# Patient Record
Sex: FEMALE | Race: AMERICAN INDIAN OR ALASKA NATIVE
[De-identification: names, ages, dates, MRNs, and addresses within clinical notes are randomized per-mention and may not be internally consistent; named-entity substitution may affect disease eponyms.]

---

## 2018-07-02 ENCOUNTER — HOSPITAL ENCOUNTER (OUTPATIENT)
Dept: HOSPITAL 5 - GIO | Age: 48
Discharge: HOME | End: 2018-07-02
Attending: INTERNAL MEDICINE
Payer: COMMERCIAL

## 2018-07-02 DIAGNOSIS — E66.9: ICD-10-CM

## 2018-07-02 DIAGNOSIS — K63.89: ICD-10-CM

## 2018-07-02 DIAGNOSIS — K57.30: ICD-10-CM

## 2018-07-02 DIAGNOSIS — Z82.49: ICD-10-CM

## 2018-07-02 DIAGNOSIS — K59.00: Primary | ICD-10-CM

## 2018-07-02 DIAGNOSIS — Z79.899: ICD-10-CM

## 2018-07-02 DIAGNOSIS — Z90.89: ICD-10-CM

## 2018-07-02 DIAGNOSIS — K64.8: ICD-10-CM

## 2018-07-02 DIAGNOSIS — Z88.8: ICD-10-CM

## 2018-07-02 PROCEDURE — 45388 COLONOSCOPY W/ABLATION: CPT

## 2018-07-02 PROCEDURE — 45384 COLONOSCOPY W/LESION REMOVAL: CPT

## 2018-07-02 PROCEDURE — 88305 TISSUE EXAM BY PATHOLOGIST: CPT

## 2018-07-02 NOTE — OPERATIVE REPORT
Operative Report


Operative Report: 


Date of procedure: 07/02/2018





Procedure: Colonoscopy with Multiple Hot Biopsy Polypectomies, Polyp Ablations. 





Attending physician: Pritesh Dietrich MD





: Pritesh Dietrich MD





Indication: Patient is a 48-year-old female who presents for screening 

colonoscopy.  This colonoscopy serves to evaluate patient so that treatment may 

be directed based on the findings.





Consent: Informed consent was obtained after advising the patient and family 

regarding nature of this procedure, its indications, potential benefits as well 

as possible complications including but not limited to bleeding perforation and 

adverse reaction to medication, infection as well as other cardiopulmonary 

complications.  An informed written and verbal consent was then obtained after 

due opportunity was provided for questions and answers.





Monitoring: Patient was monitored continuously with pulse oximetry and 

electrocardiographic recordings as well as blood pressure recordings.  Vital 

signs remained stable throughout this procedure with no untoward events.





Preoperative assessment: Patient was assessed immediately prior to this 

procedure for capacity to tolerate monitored anesthesia care and moderate 

sedation as well as general anesthesia.  Patient's ASA classification is 2, 

Mallampati class is 2, Hyomental distance is 3.





Instrument: Nimble [employer]oragen video colonoscope





Medications: Propofol given intravenously in divided doses.  For details please 

refer to anesthesia records.





Description of procedure: Patient was placed in the left lateral decubitus 

position after achieving sedation, a digital rectal examination was performed 

following which the colonoscope was introduced into the anal verge and advanced 

to the cecum which was identified by the cecal valve, the appendiceal orifice, 

as well as by the cecal strap and direct transillumination.  The colonoscope 

was subsequently withdrawn with careful inspection of all mucosal surfaces.  

Patient tolerated this procedure well and was subsequently taken to the 

recovery room.  The following findings were noted.





Findings: Preparation was fair.  Patient had few diminutive diverticula in the 

sigmoid colon.  Also, in the sigmoid colon, patient had multiple diminutive 

polyps.  These polyps measured from 3-6 mm.  The polyps were removed by hot 

biopsy polypectomy. In the descending colon, patient had diminutive polyps 

which were removed by hot biopsy polypectomy.  In the rectum, patient multiple 

diminutive polyps  that were ablated.   On the retroflexed view of the anal 

verge, patient had internal hemorrhoids.  The rest of the colon to the cecum 

was normal.  





Impression: Diminutive sigmoid colon polyps status post hot biopsy polypectomy.


Descending colon polyp status post hot biopsy polypectomy.


Diminutive rectal polyps status post ablation.


Diverticula disease of the colon.


Internal hemorrhoids.





Plan: Follow pathology report.


High-fiber diet.


Repeat colonoscopy in 1 year, due to presence of multiple colon polyps E if the 

polyps are adenomatous on pathology.  If the polyps are hyperplastic, patient 

should repeat colonoscopy in 5 years.

## 2018-07-02 NOTE — DISCHARGE SUMMARY
Short Stay Discharge Plan


Activity: advance as tolerated


Weight Bearing Status: Weight Bear as Tolerated


Diet: regular


Follow up with: 


EUGENE MANN MD [Other] - 7 Days

## 2018-07-02 NOTE — ANESTHESIA CONSULTATION
Anesthesia Consult and Med Hx


Date of service: 07/02/18





- Airway


Anesthetic Teeth Evaluation: Good


ROM Head & Neck: Adequate


Mental/Hyoid Distance: Adequate


Mallampati Class: Class III


Intubation Access Assessment: Possibly Difficult





- Pre-Operative Health Status


ASA Pre-Surgery Classification: ASA2


Proposed Anesthetic Plan: MAC





- Cardiovascular System


Hx Hypertension: Yes (no meds)





- Other Systems


Hx Obesity: Yes (BMI 34.1)